# Patient Record
Sex: MALE | Race: WHITE | URBAN - METROPOLITAN AREA
[De-identification: names, ages, dates, MRNs, and addresses within clinical notes are randomized per-mention and may not be internally consistent; named-entity substitution may affect disease eponyms.]

---

## 2020-05-27 ENCOUNTER — TELEPHONE CONVERTED (OUTPATIENT)
Dept: SURGERY | Facility: CLINIC | Age: 55
End: 2020-05-27
Attending: NURSE PRACTITIONER

## 2020-05-27 ENCOUNTER — CONVERSION ENCOUNTER (OUTPATIENT)
Dept: SURGERY | Facility: CLINIC | Age: 55
End: 2020-05-27

## 2020-06-11 ENCOUNTER — TELEPHONE CONVERTED (OUTPATIENT)
Dept: SURGERY | Facility: CLINIC | Age: 55
End: 2020-06-11
Attending: NURSE PRACTITIONER

## 2021-05-10 NOTE — H&P
History and Physical      Patient Name: Bob Ha   Patient ID: [id number]   Sex: Male   YOB: 1965    Referring Provider: Mannie Umaña MD    Visit Date: June 11, 2020    Provider: NUNO Weber   Location: [facility]   Location Address: [address] [place] [address]  [id number]   Location Phone: [phone]          Chief Complaint     Results of mri.       History Of Present Illness  TELEHEALTH TELEPHONE VISIT  Chief Complaint: CHIEF COMPLAINT   Bob Ha is a 55 year old male who is presenting for evaluation via telehealth telephone visit. Verbal consent obtained before beginning visit.   Provider spent 12 minutes with the patient during the telehealth visit.   The following staff were present during this visit: Ansley Madden -PRECIOUS   Past Medical History/ Overview of Patient Symptoms     This is a telephone follow-up visit to discuss results of mri of prostate that was on 6/5/2020.     Mri of prostate w/wo contrast reveals:  1. Prostate size is 5.0 x 4.2 x 4.9cm 49g.  2.  Peripheral zone changes which are very likely inflammatory.  The most focal region of the hyper enhancement, in the left lateral mid prostate, will be antidote in case biopsy is still desired. PI-RADS category. Clinically significant cancer is unlikely to be present.  3. Benign prostate hyperplasia.    Patient forgot to have his psa re-drawn. He is going to the lab tomorrow.              Past Medical History  Disease Name Date Onset Notes   High cholesterol --  --    Kidney calculus --  --          Past Surgical History  Procedure Name Date Notes   Appendectomy --  --          Medication List  Name Date Started Instructions   Lipitor 40 mg oral tablet  take 1 tablet (40 mg) by oral route once daily at bedtime         Allergy List  Allergen Name Date Reaction Notes   NO KNOWN DRUG ALLERGIES --  --  --        Allergies Reconciled  Social History  Finding Status Start/Stop Quantity Notes    Tobacco Former --/-- --  --          Review of Systems  · Constitutional  o Denies  o : fatigue, fever, chills, night sweats  · Genitourinary  o Denies  o : dysuria, urinary retention              Assessment  · BPH (benign prostatic hyperplasia)     600.00/N40.0  · Elevated PSA     790.93/R97.20      Plan  · Orders  o Physician Telephone Evaluation, 11-20 minutes (04665) - 600.00/N40.0, 790.93/R97.20 - 06/11/2020  · Medications  o Medications have been Reconciled  o Transition of Care or Provider Policy  · Instructions  o Plan Of Care: PSA now. Follow-up with Dr. Tucker Tracy.   o Patient instructed to seek medical attention urgently for new or worsening symptoms.  o Electronically Identified Patient Education Materials Provided Electronically  · Disposition  o Call or Return if symptoms worsen or persist.            Electronically Signed by: NUNO Weber -Author on June 11, 2020 06:39:55 PM

## 2021-05-13 NOTE — PROGRESS NOTES
Quick Note      Patient Name: Bob Ha   Patient ID: [id number]   Sex: Male   YOB: 1965    Referring Provider: Mannie Umaña MD    Visit Date: May 27, 2020    Provider: NUNO Weber   Location: [facility]   Location Address: [address] [place] [address]  [id number]   Location Phone: [phone]          History Of Present Illness  TELEHEALTH TELEPHONE VISIT  Chief Complaint: elevated psa & nocturia   Bob Ha is a 55 year old male who is presenting for evaluation via telehealth telephone visit. Verbal consent obtained before beginning visit.   Provider spent 15 minutes with the patient during telehealth visit.   The following staff were present during this visit: Ansley Madden   Past Medical History/Overview of Patient Symptoms     56 y/o male that presents today on a referral from Dr. Mic Umaña for an elevated PSA of 4.2.  Patient reports that Dr. Umaña treated him with antibiotics for 10 days and then redraw another PSA level.  I have requested those medical records.    Patient denies any urinary frequency, urgency, or dysuria.  Admits to nocturia 34X/night.  Admits to having decreased pressure in urinary stream.  Denies any family history of prostate cancer.  Denies any previous urology work-up for this.  Denies currently taking any urology medications.    Previous PSA:    5/1/2020: 4.2       Vitals  Date Time BP Position Site L\R Cuff Size HR RR TEMP (F) WT  HT  BMI kg/m2 BSA m2 O2 Sat HC       05/27/2020 12:01 PM         180lbs 0oz 6'   24.41 2.04               Assessment  · Elevated PSA     790.93/R97.20  · Nocturia     788.43/R35.1  · Urinary stream slowing     788.62/R39.198    Problems Reconciled  Plan  · Orders  o MRI prostate wo then w contrast (16568) - 790.93/R97.20, 788.43/R35.1, 788.62/R39.198 - 05/27/2020  o PSA ultrasensitive DIAGNOSTIC [facility] (70643) - 790.93/R97.20, 788.43/R35.1, 788.62/R39.198 -  05/27/2020  · Medications  o Medications have been Reconciled  o Transition of Care or Provider Policy  · Instructions  o Plan Of Care:   o Chronic conditions reviewed and taken into consideration for today's treatment plan.  o MRI prostate with and without contrast  o Re-draw psa level.  o follow-up with Dr. Tucker Tracy post imaging and lab work.  o Electronically Identified Patient Education Materials Provided Electronically  · Disposition  o Call or Return if symptoms worsen or persist.            Electronically Signed by: NUNO Weber -Author on May 27, 2020 12:58:03 PM

## 2021-05-15 VITALS — WEIGHT: 180 LBS | HEIGHT: 72 IN | BODY MASS INDEX: 24.38 KG/M2

## 2025-03-07 DIAGNOSIS — R97.20 ELEVATED PROSTATE SPECIFIC ANTIGEN (PSA): Primary | ICD-10-CM

## 2025-03-10 ENCOUNTER — LAB (OUTPATIENT)
Dept: LAB | Facility: HOSPITAL | Age: 60
End: 2025-03-10
Payer: COMMERCIAL

## 2025-03-10 DIAGNOSIS — R97.20 ELEVATED PROSTATE SPECIFIC ANTIGEN (PSA): ICD-10-CM

## 2025-03-10 LAB — PSA SERPL-MCNC: 8.41 NG/ML (ref 0–4)

## 2025-03-10 PROCEDURE — 84153 ASSAY OF PSA TOTAL: CPT

## 2025-03-10 PROCEDURE — 36415 COLL VENOUS BLD VENIPUNCTURE: CPT

## 2025-03-12 ENCOUNTER — OFFICE VISIT (OUTPATIENT)
Dept: UROLOGY | Facility: CLINIC | Age: 60
End: 2025-03-12
Payer: COMMERCIAL

## 2025-03-12 VITALS — BODY MASS INDEX: 25.19 KG/M2 | WEIGHT: 186 LBS | HEIGHT: 72 IN

## 2025-03-12 DIAGNOSIS — R97.20 ELEVATED PROSTATE SPECIFIC ANTIGEN (PSA): Primary | ICD-10-CM

## 2025-03-12 PROBLEM — I10 PRIMARY HYPERTENSION: Status: ACTIVE | Noted: 2024-09-19

## 2025-03-12 PROBLEM — N20.0 KIDNEY CALCULUS: Status: ACTIVE | Noted: 2025-03-12

## 2025-03-12 PROBLEM — E78.00 HIGH CHOLESTEROL: Status: ACTIVE | Noted: 2025-03-12

## 2025-03-12 RX ORDER — AMLODIPINE BESYLATE 10 MG/1
10 TABLET ORAL DAILY
COMMUNITY

## 2025-03-12 NOTE — PROGRESS NOTES
Chief Complaint: Elevated PSA (Patient presents today for an Elevated PSA, He states that he has no urinary issues, he states that his PSA levels have been high for years. )    Subjective         History of Present Illness  Bob Ha is a 60 y.o. male presents to [facility] to be seen for elevated psa.    Patient was seen by his PCP in 9/2024 to establish care.     He had labs done to include a Psa level.     His PSA was noted to be 8.7.    He was referred here for further evaluation and treatment.     Psa on repeat 3/10/25 was noted to be 8.4.    Prior workup in 2020 for elevated PSA his PSA previously was noted to be 4.2.    Patient did have an MRI of the prostate performed 6/5/2020 which revealed Peripheral zone changes very likely inflammatory.  Most focal region of hypoenhancement in the left lateral mid prostate will be annotated in case biopsy is still desired.  PI-RADS category 2 clinically significant cancer unlikely to be present.  As well as BPH. Patient noted to have 49 g prostate based on that imaging.    No family hx of gu malignancies.     He is a previous smoker 18-20 years 1-1.5 ppd. Quit in 2012.    Intermittent marijuana use.    He does see cardiology for CAD.        Objective     Past Medical History:   Diagnosis Date    Elevated PSA     Hypertension        Past Surgical History:   Procedure Laterality Date    APPENDECTOMY      age 3    CIRCUMCISION      age 4         Current Outpatient Medications:     amLODIPine (NORVASC) 10 MG tablet, Take 1 tablet by mouth Daily., Disp: , Rfl:     No Known Allergies     Family History   Problem Relation Age of Onset    Cancer Father         Lung cancer    Cancer Mother         Lung cancer       Social History     Socioeconomic History    Marital status:    Tobacco Use    Smoking status: Never     Passive exposure: Past    Smokeless tobacco: Never   Vaping Use    Vaping status: Never Used   Substance and Sexual  "Activity    Alcohol use: Not Currently    Drug use: Yes     Types: Marijuana     Comment: Very Rare use    Sexual activity: Defer       Vital Signs:   Ht 182.9 cm (72\")   Wt 84.4 kg (186 lb)   BMI 25.23 kg/m²      Physical Exam     Result Review :   The following data was reviewed by: NUNO Ventura on 03/12/2025:  Results for orders placed or performed in visit on 03/10/25   PSA DIAGNOSTIC    Collection Time: 03/10/25  3:08 PM    Specimen: Blood   Result Value Ref Range    PSA 8.410 (H) 0.000 - 4.000 ng/mL      PSA          3/10/2025    15:08   PSA   PSA 8.410          Procedures        Assessment and Plan    Diagnoses and all orders for this visit:    1. Elevated prostate specific antigen (PSA) (Primary)  -     Cancel: MRI Abdomen With & Without Contrast; Future  -     MRI Prostate With & Without Contrast; Future  -     PSA Diagnostic; Future      Discussed with patient given his PSA has doubled since last check I would like to proceed with an MRI of the prostate to delineate underlying etiology of elevated PSA and provide mapping for fusion biopsy.    We will proceed with exosome testing to delineate his risk for high-grade disease.          I spent 15 minutes caring for Bob on this date of service. This time includes time spent by me in the following activities:reviewing tests, obtaining and/or reviewing a separately obtained history, performing a medically appropriate examination and/or evaluation , counseling and educating the patient/family/caregiver, ordering medications, tests, or procedures, and documenting information in the medical record  Follow Up   Return in about 3 months (around 6/12/2025) for Follow-up elevated PSA.  Patient was given instructions and counseling regarding his condition or for health maintenance advice. Please see specific information pulled into the AVS if appropriate.         This document has been electronically signed by NUNO Ventura  March 12, 2025 10:26 " EDT

## 2025-04-11 ENCOUNTER — HOSPITAL ENCOUNTER (OUTPATIENT)
Dept: MRI IMAGING | Facility: HOSPITAL | Age: 60
Discharge: HOME | End: 2025-04-11
Admitting: NURSE PRACTITIONER
Payer: COMMERCIAL

## 2025-04-11 DIAGNOSIS — R97.20 ELEVATED PROSTATE SPECIFIC ANTIGEN (PSA): ICD-10-CM

## 2025-04-11 LAB
CREAT BLDA-MCNC: 1.2 MG/DL (ref 0.6–1.3)
EGFRCR SERPLBLD CKD-EPI 2021: 68.8 ML/MIN/1.73

## 2025-04-11 PROCEDURE — 25510000002 GADOBENATE DIMEGLUMINE 529 MG/ML SOLUTION: Performed by: NURSE PRACTITIONER

## 2025-04-11 PROCEDURE — A9577 INJ MULTIHANCE: HCPCS | Performed by: NURSE PRACTITIONER

## 2025-04-11 PROCEDURE — 82565 ASSAY OF CREATININE: CPT

## 2025-04-11 PROCEDURE — 72197 MRI PELVIS W/O & W/DYE: CPT

## 2025-04-11 RX ADMIN — GADOBENATE DIMEGLUMINE 15 ML: 529 INJECTION, SOLUTION INTRAVENOUS at 09:45

## 2025-04-16 ENCOUNTER — TELEPHONE (OUTPATIENT)
Dept: UROLOGY | Facility: CLINIC | Age: 60
End: 2025-04-16
Payer: COMMERCIAL

## 2025-04-16 NOTE — TELEPHONE ENCOUNTER
Called both home and mobile numbers to discuss MRI results HUB ok to relay [employer] message if patient calls back

## 2025-04-18 NOTE — TELEPHONE ENCOUNTER
Patient called back, I did read the My Chart message to him as he cannot access his My Chart. He is interested in speaking to you some more on this but he is thinking that he will do a biopsy and May 22 will work best for him. He does not have a preference on providers. He is aware that Tiff is out of the office today and Monday.

## 2025-04-22 ENCOUNTER — PREP FOR SURGERY (OUTPATIENT)
Dept: OTHER | Facility: HOSPITAL | Age: 60
End: 2025-04-22
Payer: COMMERCIAL

## 2025-04-22 DIAGNOSIS — R97.20 ELEVATED PROSTATE SPECIFIC ANTIGEN (PSA): Primary | ICD-10-CM

## 2025-04-22 RX ORDER — SODIUM CHLORIDE 0.9 % (FLUSH) 0.9 %
10 SYRINGE (ML) INJECTION AS NEEDED
OUTPATIENT
Start: 2025-04-22

## 2025-04-22 RX ORDER — SODIUM CHLORIDE 9 MG/ML
100 INJECTION, SOLUTION INTRAVENOUS CONTINUOUS
OUTPATIENT
Start: 2025-04-22 | End: 2025-04-23

## 2025-04-22 RX ORDER — SODIUM CHLORIDE 0.9 % (FLUSH) 0.9 %
3 SYRINGE (ML) INJECTION EVERY 12 HOURS SCHEDULED
OUTPATIENT
Start: 2025-04-22

## 2025-04-22 RX ORDER — SODIUM CHLORIDE 9 MG/ML
40 INJECTION, SOLUTION INTRAVENOUS AS NEEDED
OUTPATIENT
Start: 2025-04-22

## 2025-05-08 ENCOUNTER — TELEPHONE (OUTPATIENT)
Dept: UROLOGY | Age: 60
End: 2025-05-08
Payer: COMMERCIAL

## 2025-05-08 NOTE — TELEPHONE ENCOUNTER
from [facility] called and states Patient has to cancel his surgery, patient was supposed to have a stress test done at Dr Flowers office before his surgery and did not due his stress test and Dr Gonzáles is out of state and his staff is not comfortable to give anaesthesia  a decision. So patient ill need to have Surgery cancelled or tomorrow until he gets a stress test done.

## 2025-05-09 ENCOUNTER — TELEPHONE (OUTPATIENT)
Dept: UROLOGY | Age: 60
End: 2025-05-09
Payer: COMMERCIAL

## 2025-05-09 NOTE — TELEPHONE ENCOUNTER
LM for patient to cancel his follow up appointment with Tiff since his surgery is still pending. HUB okay to relay and cancel if he calls back

## 2025-05-09 NOTE — TELEPHONE ENCOUNTER
----- Message from Sheela DUFF sent at 5/9/2025  9:58 AM EDT -----  Regarding: FW: surgery postponed  Here go  ----- Message -----  From: Tiff Paz APRN  Sent: 5/9/2025   8:29 AM EDT  To: Sheela Oliveira RN  Subject: RE: surgery postponed                            Noted….can you make sure his f/u with me is cancelled? Sine we are awaiting clearance and surgery there’s no need to f/u with me.  ----- Message -----  From: Sheela Oliveira RN  Sent: 5/8/2025   3:09 PM CDT  To: NUNO Ventura; Cordelia Griffith MA  Subject: surgery postponed                                According to anesthesia and PAT pt will need stress test for clearance prior to surgery; pt surgery postponed; just making you aware; unsure of when surgery will be as don't know when stress test will be done

## 2025-06-04 ENCOUNTER — TELEPHONE (OUTPATIENT)
Dept: UROLOGY | Age: 60
End: 2025-06-04
Payer: COMMERCIAL

## 2025-06-04 NOTE — TELEPHONE ENCOUNTER
Call made to pt to see if got appt w/ cardiology so that we could get clearance for surgery; pt states he has not as he has been busy and was not sure if they were going to call him or not; RN states that pt can call cardiology and make appt and once we get clearance we can proceed with surgery; pt is understanding and appreciative.